# Patient Record
Sex: MALE | Race: WHITE
[De-identification: names, ages, dates, MRNs, and addresses within clinical notes are randomized per-mention and may not be internally consistent; named-entity substitution may affect disease eponyms.]

---

## 2020-09-25 ENCOUNTER — HOSPITAL ENCOUNTER (OUTPATIENT)
Dept: HOSPITAL 19 - SURG | Age: 80
LOS: 1 days | Discharge: HOME | End: 2020-09-26
Attending: UROLOGY
Payer: MEDICARE

## 2020-09-25 VITALS — DIASTOLIC BLOOD PRESSURE: 62 MMHG | SYSTOLIC BLOOD PRESSURE: 180 MMHG | HEART RATE: 55 BPM | TEMPERATURE: 97.8 F

## 2020-09-25 VITALS — DIASTOLIC BLOOD PRESSURE: 51 MMHG | HEART RATE: 54 BPM | SYSTOLIC BLOOD PRESSURE: 146 MMHG

## 2020-09-25 VITALS — DIASTOLIC BLOOD PRESSURE: 57 MMHG | SYSTOLIC BLOOD PRESSURE: 152 MMHG | HEART RATE: 51 BPM

## 2020-09-25 VITALS — HEART RATE: 57 BPM | DIASTOLIC BLOOD PRESSURE: 61 MMHG | SYSTOLIC BLOOD PRESSURE: 182 MMHG | TEMPERATURE: 97.8 F

## 2020-09-25 VITALS — HEART RATE: 58 BPM | DIASTOLIC BLOOD PRESSURE: 70 MMHG | SYSTOLIC BLOOD PRESSURE: 174 MMHG

## 2020-09-25 VITALS — DIASTOLIC BLOOD PRESSURE: 95 MMHG | SYSTOLIC BLOOD PRESSURE: 182 MMHG | HEART RATE: 57 BPM

## 2020-09-25 VITALS — DIASTOLIC BLOOD PRESSURE: 62 MMHG | SYSTOLIC BLOOD PRESSURE: 164 MMHG | HEART RATE: 56 BPM

## 2020-09-25 VITALS — HEART RATE: 57 BPM | SYSTOLIC BLOOD PRESSURE: 135 MMHG | DIASTOLIC BLOOD PRESSURE: 56 MMHG

## 2020-09-25 VITALS — WEIGHT: 268.96 LBS | BODY MASS INDEX: 38.51 KG/M2 | HEIGHT: 70 IN

## 2020-09-25 VITALS — HEART RATE: 59 BPM | SYSTOLIC BLOOD PRESSURE: 162 MMHG | DIASTOLIC BLOOD PRESSURE: 62 MMHG

## 2020-09-25 VITALS — SYSTOLIC BLOOD PRESSURE: 145 MMHG | DIASTOLIC BLOOD PRESSURE: 95 MMHG | TEMPERATURE: 98.2 F | HEART RATE: 66 BPM

## 2020-09-25 DIAGNOSIS — Z85.72: ICD-10-CM

## 2020-09-25 DIAGNOSIS — Z87.891: ICD-10-CM

## 2020-09-25 DIAGNOSIS — Z79.82: ICD-10-CM

## 2020-09-25 DIAGNOSIS — N40.0: Primary | ICD-10-CM

## 2020-09-25 DIAGNOSIS — M19.90: ICD-10-CM

## 2020-09-25 DIAGNOSIS — Z79.02: ICD-10-CM

## 2020-09-25 DIAGNOSIS — F41.9: ICD-10-CM

## 2020-09-25 DIAGNOSIS — I10: ICD-10-CM

## 2020-09-25 DIAGNOSIS — R31.0: ICD-10-CM

## 2020-09-25 LAB
BASOPHILS # BLD: 0 10*3/UL (ref 0–0.2)
BASOPHILS NFR BLD AUTO: 0.4 % (ref 0–2)
EOSINOPHIL # BLD: 0.3 10*3/UL (ref 0–0.7)
EOSINOPHIL NFR BLD: 4 % (ref 0–4)
ERYTHROCYTE [DISTWIDTH] IN BLOOD BY AUTOMATED COUNT: 14.4 % (ref 11.5–14.5)
GRANULOCYTES # BLD AUTO: 71.6 % (ref 42.2–75.2)
HCT VFR BLD AUTO: 28.8 % (ref 42–52)
HGB BLD-MCNC: 9.4 G/DL (ref 13.5–18)
LYMPHOCYTES # BLD: 1.1 10*3/UL (ref 1.2–3.4)
LYMPHOCYTES NFR BLD: 13.6 % (ref 20–51)
MCH RBC QN AUTO: 30 PG (ref 27–31)
MCHC RBC AUTO-ENTMCNC: 33 G/DL (ref 33–37)
MCV RBC AUTO: 91 FL (ref 80–100)
MONOCYTES # BLD: 0.8 10*3/UL (ref 0.1–0.6)
MONOCYTES NFR BLD AUTO: 9.9 % (ref 1.7–9.3)
NEUTROPHILS # BLD: 5.6 10*3/UL (ref 1.4–6.5)
PLATELET # BLD AUTO: 233 K/MM3 (ref 130–400)
PMV BLD AUTO: 9.5 FL (ref 7.4–10.4)
RBC # BLD AUTO: 3.15 M/MM3 (ref 4.2–5.6)

## 2020-09-25 NOTE — NUR
Patient all admited to room 324. Inital,5 page & med rec completed. Anesthesia
rounded & Dr. Harding saw patient. Consent obtained. IV started & fluids per
orders. Patient has been NPO. He denies difficulty with urination. No au
started at this time. His wife at bedside. Will monitor.

## 2020-09-25 NOTE — NUR
Patient has done well post op. Vss. Cbi to slow rate. clear yellow output.
dinner ordered. Patient wife at bedside. Scds ble.

## 2020-09-25 NOTE — NUR
Pt. sitting up in bed at this time. Pt. is A&OX3, assessment complete.  INT to
lt. hand patent.  3-way au catheter with CBI to DD, urine is light pink
with no clots noted. Pt. denies pain or other needs, call light within reach.

## 2020-09-26 VITALS — TEMPERATURE: 97.6 F | HEART RATE: 68 BPM | SYSTOLIC BLOOD PRESSURE: 109 MMHG | DIASTOLIC BLOOD PRESSURE: 70 MMHG

## 2020-09-26 VITALS — HEART RATE: 61 BPM | TEMPERATURE: 98.5 F | SYSTOLIC BLOOD PRESSURE: 155 MMHG | DIASTOLIC BLOOD PRESSURE: 61 MMHG

## 2020-09-26 VITALS — TEMPERATURE: 98.5 F | SYSTOLIC BLOOD PRESSURE: 149 MMHG | HEART RATE: 57 BPM | DIASTOLIC BLOOD PRESSURE: 60 MMHG

## 2020-09-26 NOTE — NUR
Patient primed and pulled. primed with 250ml of fluid. Patient tolerated
procedure well. Patient and spouse educated on 6 cup routine.

## 2020-09-26 NOTE — NUR
Patient in bed resting. Alert and oriented x 3. Assessment complete. Denies
pain at this time. CBI clamped at this time. [place] with clear yellow urine.
Denies further needs at this time.

## 2020-09-26 NOTE — NUR
Discharge education provided to patient. Educated on when to call provider and
scheduling follow up appointment. Patient continues voiding clear peach urine.
Patient educated on new medication. All questions answered, INT discontinued,
catheter tip intact. Denies further needs.